# Patient Record
Sex: FEMALE
[De-identification: names, ages, dates, MRNs, and addresses within clinical notes are randomized per-mention and may not be internally consistent; named-entity substitution may affect disease eponyms.]

---

## 2018-08-20 ENCOUNTER — HOSPITAL ENCOUNTER (EMERGENCY)
Dept: HOSPITAL 14 - H.ER | Age: 63
Discharge: HOME | End: 2018-08-20
Payer: MEDICARE

## 2018-08-20 VITALS — DIASTOLIC BLOOD PRESSURE: 82 MMHG | HEART RATE: 82 BPM | SYSTOLIC BLOOD PRESSURE: 130 MMHG | RESPIRATION RATE: 18 BRPM

## 2018-08-20 VITALS — TEMPERATURE: 97 F | OXYGEN SATURATION: 99 %

## 2018-08-20 DIAGNOSIS — R10.9: Primary | ICD-10-CM

## 2018-08-20 DIAGNOSIS — K59.00: ICD-10-CM

## 2018-08-20 DIAGNOSIS — N83.201: ICD-10-CM

## 2018-08-20 DIAGNOSIS — F32.9: ICD-10-CM

## 2018-08-20 DIAGNOSIS — Z88.0: ICD-10-CM

## 2018-08-20 DIAGNOSIS — F41.9: ICD-10-CM

## 2018-08-20 LAB
ALBUMIN SERPL-MCNC: 4.2 G/DL (ref 3.5–5)
ALBUMIN/GLOB SERPL: 1.1 {RATIO} (ref 1–2.1)
ALT SERPL-CCNC: 31 U/L (ref 9–52)
AST SERPL-CCNC: 30 U/L (ref 14–36)
BASOPHILS # BLD AUTO: 0.1 K/UL (ref 0–0.2)
BASOPHILS NFR BLD: 0.9 % (ref 0–2)
BUN SERPL-MCNC: 13 MG/DL (ref 7–17)
CALCIUM SERPL-MCNC: 9.5 MG/DL (ref 8.4–10.2)
EOSINOPHIL # BLD AUTO: 0 K/UL (ref 0–0.7)
EOSINOPHIL NFR BLD: 0.4 % (ref 0–4)
ERYTHROCYTE [DISTWIDTH] IN BLOOD BY AUTOMATED COUNT: 13.2 % (ref 11.5–14.5)
GFR NON-AFRICAN AMERICAN: > 60
HGB BLD-MCNC: 12.7 G/DL (ref 12–16)
LIPASE SERPL-CCNC: 85 U/L (ref 23–300)
LYMPHOCYTES # BLD AUTO: 1.4 K/UL (ref 1–4.3)
LYMPHOCYTES NFR BLD AUTO: 22.2 % (ref 20–40)
MCH RBC QN AUTO: 29 PG (ref 27–31)
MCHC RBC AUTO-ENTMCNC: 34 G/DL (ref 33–37)
MCV RBC AUTO: 85.4 FL (ref 81–99)
MONOCYTES # BLD: 0.3 K/UL (ref 0–0.8)
MONOCYTES NFR BLD: 4.9 % (ref 0–10)
NEUTROPHILS # BLD: 4.6 K/UL (ref 1.8–7)
NEUTROPHILS NFR BLD AUTO: 71.6 % (ref 50–75)
NRBC BLD AUTO-RTO: 0 % (ref 0–0)
PLATELET # BLD: 205 K/UL (ref 130–400)
PMV BLD AUTO: 7.5 FL (ref 7.2–11.7)
RBC # BLD AUTO: 4.39 MIL/UL (ref 3.8–5.2)
WBC # BLD AUTO: 6.4 K/UL (ref 4.8–10.8)

## 2018-08-20 PROCEDURE — 96374 THER/PROPH/DIAG INJ IV PUSH: CPT

## 2018-08-20 PROCEDURE — 80053 COMPREHEN METABOLIC PANEL: CPT

## 2018-08-20 PROCEDURE — 99284 EMERGENCY DEPT VISIT MOD MDM: CPT

## 2018-08-20 PROCEDURE — 85025 COMPLETE CBC W/AUTO DIFF WBC: CPT

## 2018-08-20 PROCEDURE — 83690 ASSAY OF LIPASE: CPT

## 2018-08-20 PROCEDURE — 74176 CT ABD & PELVIS W/O CONTRAST: CPT

## 2018-08-20 NOTE — CT
Date of service: 



08/20/2018



PROCEDURE:  CT abdomen pelvis 08/20/2018 



HISTORY:

Rule out stone.  Right-sided pain with nausea. 



COMPARISON:

No prior study available for comparison. 



TECHNIQUE:

Contiguous axial images of the abdomen pelvis performed of without 

oral or intravenous contrast material.  Additional 2D sagittal and 

coronal reformats generated. 



This CT exam was performed using one or more of the following dose 

reduction techniques: Automated exposure control, adjustment of the 

mA and/or kV according to patient size, and/or use of iterative 

reconstruction technique.



Radiation dose:



Total exam DLP = 275.41 mGy-cm.



FINDINGS:



LOWER THORAX:

Unremarkable.



LIVER:

Liver exhibits relatively normal sun 9 just over 14 cm in CC 

dimension. No obvious hepatic mass or collection seen on noncontrast 

study.



GALLBLADDER AND BILE DUCTS:

Gallbladder appears incompletely distended.  No obvious intraluminal 

gallbladder calculi 



PANCREAS:

Unenhanced pancreas poorly seen though appears grossly unremarkable 

without masses or collections.



SPLEEN:

Spleen exhibits normal size and attenuation pattern without mass 

collection or calcification. 



ADRENALS:

There appear to be bilateral adrenal nodules which exhibit low 

attenuation possibly representing adrenal adenomas.  Follow-up at 

interval could be performed to assess stability. 



KIDNEYS AND URETERS:

Kidneys demonstrates relatively symmetric size. No evidence of 

nephrolithiasis or hydronephrosis.



BLADDER:

Urinary bladder physiologically distended.  No evidence of 

intraluminal urinary bladder calculi.



REPRODUCTIVE:

There appears to be a right-sided adnexal cystic cysts focus 

measuring 2.5 x 2.1 cm. Follow-up pelvic ultrasound could be 

performed to confirm. 



APPENDIX:

What appears to represent a normal appendix best seen on axial 

sequence image number [id number]. 



BOWEL:

Evaluation of the bowel is limited due to the lack of oral contrast 

material.  Stomach is incompletely distended which presumably 

accounts for thick-walled appearance.  Gastritis or other 

intrinsic/invasive wall lesion not excluded.  Clinical correlation 

recommended.



Visualized loops of small bowel exhibit normal contour and caliber. 

No evidence of acute mechanical small bowel obstruction.  There is a 

large amount of stool within the large bowel consistent with fecal 

retention/ constipation. Note made of what appears represent some 

vague nonspecific infiltration changes in the mesentery adjacent to 

the posterior inferior margin of cecum nonspecific 



PERITONEUM:

Unremarkable. No fluid collection. No free air.



LYMPH NODES:

Unremarkable. No enlarged lymph nodes. 



VASCULATURE:

Unremarkable. No aortic aneurysm. 



BONES:

Multilevel degenerative spondylosis of the lower thoracic and lumbar 

spine.  There is also a mild scoliosis dextroscoliosis centered at 

approximately L3-L4 level.



OTHER FINDINGS:

None. 



IMPRESSION:

Suspect right adnexal cyst as above. Follow-up pelvic ultrasound 

could confirm 



No evidence of nephrolithiasis or hydronephrosis. 



Note made of what appears represent some vague nonspecific 

infiltration changes in the mesentery adjacent to the posterior 

inferior margin of cecum nonspecific. No definitive radiographic 

evidence to suggest acute appendicitis however Findings consistent 

with constipation.

## 2018-08-20 NOTE — ED PDOC
HPI: Abdomen


Time Seen by Provider: 08/20/18 11:55


Chief Complaint (Nursing): Abdominal Pain


Chief Complaint (Provider): abd pain


History Per: Patient


History/Exam Limitations: no limitations


Onset/Duration Of Symptoms: Days (3 )


Current Symptoms Are (Timing): Still Present


Additional Complaint(s): 





Pt. with off and on R upper and lower abd pain.  No specific cause.  No vomit, 

but has nausea.  No chest pain, dyspnea, back pain, weakness, dysuria, vaginal 

bleeding.  No fever.  No new food or drinks/no travel.  





Past Medical History


Reviewed: Nursing Documentation, Vital Signs


Vital Signs: 


 Last Vital Signs











Temp  97 F L  08/20/18 11:42


 


Pulse  85   08/20/18 11:42


 


Resp  17   08/20/18 11:42


 


BP  138/80   08/20/18 11:42


 


Pulse Ox  99   08/20/18 12:17














- Medical History


PMH: Anxiety, Depression


   Denies: Diabetes, Hepatitis, HIV, HTN, Seizures, Sexually Transmitted Disease





- Surgical History


Surgical History: Endoscopy (2012)





- Family History


Family History: States: Unknown Family Hx





- Immunization History


Hx Tetanus Toxoid Vaccination: Yes (less then 5 yrs)


Hx Influenza Vaccination: No


Hx Pneumococcal Vaccination: No





- Home Medications


Home Medications: 


 Ambulatory Orders











 Medication  Instructions  Recorded


 


Magnesium Citrate [[facility] 150 ml PO DAILY PRN 5 Days  bottle 08/20/18





Pharmacy Magnesium Citrate]  














- Allergies


Allergies/Adverse Reactions: 


 Allergies











Allergy/AdvReac Type Severity Reaction Status Date / Time


 


iodine Allergy  RASH Verified 08/28/16 10:46


 


Penicillins Allergy  RASH Verified 08/28/16 10:46














Review of Systems


ROS Statement: Except As Marked, All Systems Reviewed And Found Negative


Gastrointestinal: Positive for: Nausea, Abdominal Pain





Physical Exam





- Reviewed


Nursing Documentation Reviewed: Yes


Vital Signs Reviewed: Yes





- Physical Exam


Appears: Positive for: Non-toxic, No Acute Distress


Head Exam: Positive for: ATRAUMATIC, NORMAL INSPECTION, NORMOCEPHALIC


Skin: Positive for: Normal Color, Warm, DRY


Eye Exam: Positive for: EOMI, Normal appearance, PERRL


ENT: Positive for: Normal ENT Inspection


Neck: Positive for: Normal, Painless ROM


Cardiovascular/Chest: Positive for: Regular Rate, Rhythm


Respiratory: Positive for: CNT, Normal Breath Sounds


Gastrointestinal/Abdominal: Positive for: Normal Exam, Soft, Tenderness (mild 

right mid and lower)


Back: Positive for: Normal Inspection.  Negative for: L CVA Tenderness, R CVA 

Tenderness


Extremity: Positive for: Normal ROM


Neurologic/Psych: Positive for: Alert, Oriented





- Laboratory Results


Result Diagrams: 


 08/20/18 12:35





 08/20/18 12:35


Interpretation Of Abn Labs: no acute





- ECG


O2 Sat by Pulse Oximetry: 99


Pulse Ox Interpretation: Normal





- CT Scan/US


  ** ct


Other Rad Studies (CT/US): Read By Radiologist


Other Rad Interpretation: ovarian cyst, constipation





- Progress


ED Course And Treament: 





1501:  Stable.  AAOx3.  Pain free.  Tolerated PO.  FU with pcp.  Did not want 

any pain meds during stay.  Is comfortable to go home and fu. 





Disposition





- Clinical Impression


Clinical Impression: 


 Abdominal pain, Constipation, Ovarian cyst








- Patient ED Disposition


Is Patient to be Admitted: No


Counseled Patient/Family Regarding: Studies Performed, Diagnosis, Need For 

Followup, Rx Given





- Disposition


Referrals: 


[facility] [Outside] - 08/21/18


Disposition: Routine/Home


Disposition Time: 15:02


Condition: STABLE


Additional Instructions: 


Return if not better in 3 days. 


Prescriptions: 


Magnesium Citrate [[facility] Magnesium Citrate] 150 ml PO DAILY 

PRN 5 Days  bottle


 PRN Reason: Constipation


Instructions:  Ovarian Cysts, Constipation, Adult (DC), Stomach Ache and 

Stomach Upset


Forms:  [facility] (English)